# Patient Record
Sex: FEMALE | Race: WHITE | NOT HISPANIC OR LATINO | Employment: FULL TIME | URBAN - METROPOLITAN AREA
[De-identification: names, ages, dates, MRNs, and addresses within clinical notes are randomized per-mention and may not be internally consistent; named-entity substitution may affect disease eponyms.]

---

## 2021-06-08 RX ORDER — LEVOTHYROXINE SODIUM 112 UG/1
224 TABLET ORAL DAILY
Qty: 180 TABLET | Refills: 0 | Status: SHIPPED | OUTPATIENT
Start: 2021-06-08 | End: 2021-10-07

## 2021-10-07 RX ORDER — LEVOTHYROXINE SODIUM 112 UG/1
224 TABLET ORAL DAILY
Qty: 180 TABLET | Refills: 0 | Status: SHIPPED | OUTPATIENT
Start: 2021-10-07 | End: 2021-11-15 | Stop reason: SDUPTHER

## 2021-11-12 ENCOUNTER — LAB (OUTPATIENT)
Dept: LAB | Facility: HOSPITAL | Age: 46
End: 2021-11-12

## 2021-11-12 ENCOUNTER — OFFICE VISIT (OUTPATIENT)
Dept: ENDOCRINOLOGY | Facility: CLINIC | Age: 46
End: 2021-11-12

## 2021-11-12 VITALS
BODY MASS INDEX: 40.29 KG/M2 | SYSTOLIC BLOOD PRESSURE: 124 MMHG | HEIGHT: 69 IN | DIASTOLIC BLOOD PRESSURE: 72 MMHG | HEART RATE: 80 BPM | OXYGEN SATURATION: 99 % | WEIGHT: 272 LBS

## 2021-11-12 DIAGNOSIS — E89.0 POSTOPERATIVE HYPOTHYROIDISM: Primary | ICD-10-CM

## 2021-11-12 DIAGNOSIS — Z85.850 PERSONAL HISTORY OF MALIGNANT NEOPLASM OF THYROID: ICD-10-CM

## 2021-11-12 DIAGNOSIS — E89.0 POSTOPERATIVE HYPOTHYROIDISM: ICD-10-CM

## 2021-11-12 PROCEDURE — 99214 OFFICE O/P EST MOD 30 MIN: CPT | Performed by: INTERNAL MEDICINE

## 2021-11-12 PROCEDURE — 84432 ASSAY OF THYROGLOBULIN: CPT

## 2021-11-12 PROCEDURE — 84443 ASSAY THYROID STIM HORMONE: CPT

## 2021-11-12 RX ORDER — CETIRIZINE HYDROCHLORIDE 10 MG/1
1 TABLET ORAL DAILY
COMMUNITY

## 2021-11-12 RX ORDER — BUPROPION HYDROCHLORIDE 100 MG/1
1 TABLET, EXTENDED RELEASE ORAL 2 TIMES DAILY
COMMUNITY
Start: 2021-08-29 | End: 2022-11-18

## 2021-11-12 RX ORDER — FLUTICASONE PROPIONATE 93 UG/1
SPRAY, METERED NASAL AS NEEDED
COMMUNITY
Start: 2021-10-14

## 2021-11-12 RX ORDER — CITALOPRAM 40 MG/1
1 TABLET ORAL DAILY
COMMUNITY
Start: 2021-10-22

## 2021-11-12 NOTE — ASSESSMENT & PLAN NOTE
Neck exam shows nothing untoward. Will check tg  But so far she appears to have had a complete clinical and biochemical response

## 2021-11-12 NOTE — PROGRESS NOTES
"     Office Note      Date: 2021  Patient Name: Lou Romero  MRN: [id number]  : 1975    Chief Complaint   Patient presents with   • Thyroid Problem       History of Present Illness:   Lou Romero is a 46 y.o. female who presents for Thyroid Problem   she has hypothyroidism and hx of thyroid cancer  Here for routine appointment  ---  She has infected  Right salivary gland  Removed since last time  ---  Now she feels like there is swelling on the left side     Subjective          Review of Systems:   Review of Systems   HENT:        Left neck swelling       The following portions of the patient's history were reviewed and updated as appropriate: allergies, current medications, past family history, past medical history, past social history, past surgical history and problem list.    Objective     Visit Vitals  /72   Pulse 80   Ht 175.3 cm (69\")   Wt 123 kg (272 lb)   SpO2 99%   BMI 40.17 kg/m²       Labs:    CBC w/DIFF  Lab Results   Component Value Date    WBC 6.9 07/15/2021    RBC 4.21 07/15/2021    HGB 11.1 (L) 07/15/2021    HCT 32.9 (L) 07/15/2021    MCV 78.1 (L) 07/15/2021    MCH 26.3 07/15/2021    MCHC 33.7 07/15/2021    RDW 16.6 (H) 07/15/2021    MPV 8.3 07/15/2021     07/15/2021    NEUTRORELPCT 71.5 (H) 07/15/2021    MONORELPCT 8.2 07/15/2021    EOSRELPCT 1.8 07/15/2021    BASORELPCT 0.3 07/15/2021    NEUTROABS 4.9 07/15/2021    LYMPHSABS 1.3 07/15/2021    MONOSABS 0.6 07/15/2021    EOSABS 0.1 07/15/2021    BASOSABS 0.0 07/15/2021       T4  Free T4   Date Value Ref Range Status   2020 1.26 (H) 0.61 - 1.12 Final     Comment:     Updated 19, Free T4 assays can have false high results when the  concentration of biotin in the patient sample is 10 ng/mL or greater.       TSH  No results found for: TSHBASE     Physical Exam:  Physical Exam  Vitals reviewed.   HENT:      Head: Normocephalic and atraumatic.   Eyes:      Extraocular Movements: Extraocular movements intact. "   Neck:      Comments: No palpable thyroid tissue or neck mass  Lymphadenopathy:      Cervical: No cervical adenopathy.   Psychiatric:         Mood and Affect: Mood normal.         Thought Content: Thought content normal.         Judgment: Judgment normal.         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         Current Assessment & Plan     Clinically euthyroid. Thyroid levels ordered. Medication to be adjusted accordingly.         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  tg 0 in 2018 with tsh of 36  Neck exam, tg negative in 2020         Current Assessment & Plan     Neck exam shows nothing untoward. Will check tg  But so far she appears to have had a complete clinical and biochemical response          Relevant Orders    Thyroglobulin           Zachary Little MD   11/12/2021

## 2021-11-12 NOTE — ASSESSMENT & PLAN NOTE
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  tg 0 in 2018 with tsh of 36  Neck exam, tg negative in 2020

## 2021-11-13 LAB — TSH SERPL DL<=0.05 MIU/L-ACNC: 0.19 UIU/ML (ref 0.27–4.2)

## 2021-11-15 RX ORDER — LEVOTHYROXINE SODIUM 112 UG/1
224 TABLET ORAL DAILY
Qty: 180 TABLET | Refills: 3 | Status: SHIPPED | OUTPATIENT
Start: 2021-11-15 | End: 2022-11-20 | Stop reason: DRUGHIGH

## 2021-11-23 LAB — THYROGLOB SERPL-MCNC: <2 NG/ML

## 2022-11-18 ENCOUNTER — LAB (OUTPATIENT)
Dept: LAB | Facility: HOSPITAL | Age: 47
End: 2022-11-18

## 2022-11-18 ENCOUNTER — OFFICE VISIT (OUTPATIENT)
Dept: ENDOCRINOLOGY | Facility: CLINIC | Age: 47
End: 2022-11-18

## 2022-11-18 VITALS
WEIGHT: 265 LBS | HEART RATE: 68 BPM | OXYGEN SATURATION: 98 % | SYSTOLIC BLOOD PRESSURE: 132 MMHG | HEIGHT: 69 IN | DIASTOLIC BLOOD PRESSURE: 76 MMHG | BODY MASS INDEX: 39.25 KG/M2

## 2022-11-18 DIAGNOSIS — E89.0 POSTOPERATIVE HYPOTHYROIDISM: Primary | ICD-10-CM

## 2022-11-18 DIAGNOSIS — Z85.850 PERSONAL HISTORY OF MALIGNANT NEOPLASM OF THYROID: ICD-10-CM

## 2022-11-18 DIAGNOSIS — E89.0 POSTOPERATIVE HYPOTHYROIDISM: ICD-10-CM

## 2022-11-18 PROCEDURE — 86800 THYROGLOBULIN ANTIBODY: CPT

## 2022-11-18 PROCEDURE — 84432 ASSAY OF THYROGLOBULIN: CPT

## 2022-11-18 PROCEDURE — 84443 ASSAY THYROID STIM HORMONE: CPT

## 2022-11-18 PROCEDURE — 99213 OFFICE O/P EST LOW 20 MIN: CPT | Performed by: INTERNAL MEDICINE

## 2022-11-18 RX ORDER — BUSPIRONE HYDROCHLORIDE 7.5 MG/1
TABLET ORAL
COMMUNITY

## 2022-11-18 RX ORDER — FLUOXETINE HYDROCHLORIDE 40 MG/1
CAPSULE ORAL
COMMUNITY

## 2022-11-18 RX ORDER — BUPROPION HYDROCHLORIDE 150 MG/1
TABLET, EXTENDED RELEASE ORAL
COMMUNITY

## 2022-11-18 NOTE — PROGRESS NOTES
"     Office Note      Date: 2022  Patient Name: Lou Romero  MRN: [id number]  : 1975    Chief Complaint   Patient presents with   • Hypothyroidism       History of Present Illness:   Lou Romero is a 47 y.o. female who presents for Hypothyroidism  and hx of thyroid cancer  Rx: T4 224 mcg per day  Changes: has been diagnosed with low iron and low b12  Questions/problem: none    Subjective     Review of Systems:   Review of Systems   Constitutional: Positive for fatigue. Negative for unexpected weight change.   HENT: Positive for sore throat. Negative for trouble swallowing and voice change.    Eyes: Negative for visual disturbance.   Respiratory: Negative for choking.    Cardiovascular: Positive for palpitations.   Endocrine: Positive for heat intolerance.   Musculoskeletal: Positive for myalgias.   Neurological: Positive for headaches. Negative for tremors.   Psychiatric/Behavioral: Positive for sleep disturbance. Negative for dysphoric mood. The patient is nervous/anxious.        The following portions of the patient's history were reviewed and updated as appropriate: allergies, current medications, past family history, past medical history, past social history, past surgical history and problem list.    Objective     Visit Vitals  /76   Pulse 68   Ht 175.3 cm (69\")   Wt 120 kg (265 lb)   SpO2 98%   BMI 39.13 kg/m²       Labs:    CBC w/DIFF  Lab Results   Component Value Date    WBC 3.9 (L) 2022    RBC 4.73 2022    HGB 14.4 2022    HCT 42.9 2022    MCV 90.6 2022    MCH 30.5 2022    MCHC 33.6 2022    RDW 15.4 2022    MPV 8.6 2022     2022    NEUTRORELPCT 58.8 2022    MONORELPCT 13.9 (H) 2022    EOSRELPCT 1.9 2022    BASORELPCT 0.4 2022    NEUTROABS 2.3 2022    LYMPHSABS 1.0 (L) 2022    MONOSABS 0.6 2022    EOSABS 0.1 2022    BASOSABS 0.0 2022       T4  Free T4   Date Value " Ref Range Status   01/23/2020 1.26 (H) 0.61 - 1.12 Final     Comment:     Updated 8/23/19, Free T4 assays can have false high results when the  concentration of biotin in the patient sample is 10 ng/mL or greater.       TSH  No results found for: TSHBASE     Physical Exam:  Physical Exam  Vitals reviewed.   Constitutional:       Appearance: Normal appearance.   HENT:      Head: Normocephalic and atraumatic.   Eyes:      Extraocular Movements: Extraocular movements intact.   Neck:      Comments: No palpable thyroid tissue  Pulmonary:      Effort: Pulmonary effort is normal.   Lymphadenopathy:      Cervical: No cervical adenopathy.   Neurological:      Mental Status: She is alert.      Comments: tremor   Psychiatric:         Mood and Affect: Mood normal.         Thought Content: Thought content normal.         Judgment: Judgment normal.         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  tsh at goal 2021         Current Assessment & Plan     Clinically might be on too much thyroid medication. 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  tg 0 in 2018 with tsh of 36  Neck exam, tg negative in 2020  Neck exam and tg negative in 2021         Current Assessment & Plan     Stable  No clinical evidence of recurrence. Will check tg to confirm          Relevant Orders    Thyroglobulin + Thyroglobulin Antibody ([place])        Zachary Little MD   11/18/2022

## 2022-11-19 LAB — TSH SERPL DL<=0.05 MIU/L-ACNC: 0.09 UIU/ML (ref 0.27–4.2)

## 2022-11-20 LAB — REF LAB TEST METHOD: NORMAL

## 2022-11-20 RX ORDER — LEVOTHYROXINE SODIUM 0.2 MG/1
200 TABLET ORAL DAILY
Qty: 90 TABLET | Refills: 3 | Status: SHIPPED | OUTPATIENT
Start: 2022-11-20 | End: 2023-11-20

## 2022-11-21 DIAGNOSIS — E89.0 POSTOPERATIVE HYPOTHYROIDISM: Primary | ICD-10-CM

## 2023-01-03 RX ORDER — LEVOTHYROXINE SODIUM 112 UG/1
224 TABLET ORAL DAILY
Qty: 180 TABLET | Refills: 3 | Status: SHIPPED | OUTPATIENT
Start: 2023-01-03

## 2023-11-17 ENCOUNTER — OFFICE VISIT (OUTPATIENT)
Dept: ENDOCRINOLOGY | Facility: CLINIC | Age: 48
End: 2023-11-17
Payer: COMMERCIAL

## 2023-11-17 VITALS
DIASTOLIC BLOOD PRESSURE: 78 MMHG | SYSTOLIC BLOOD PRESSURE: 122 MMHG | WEIGHT: 270 LBS | HEART RATE: 64 BPM | OXYGEN SATURATION: 97 % | HEIGHT: 69 IN | BODY MASS INDEX: 39.99 KG/M2

## 2023-11-17 DIAGNOSIS — E89.0 POSTOPERATIVE HYPOTHYROIDISM: Primary | ICD-10-CM

## 2023-11-17 DIAGNOSIS — Z85.850 PERSONAL HISTORY OF MALIGNANT NEOPLASM OF THYROID: ICD-10-CM

## 2023-11-17 LAB
T4 FREE SERPL-MCNC: 1.2 NG/DL (ref 0.93–1.7)
TSH SERPL DL<=0.05 MIU/L-ACNC: 8.98 UIU/ML (ref 0.27–4.2)

## 2023-11-17 PROCEDURE — 84439 ASSAY OF FREE THYROXINE: CPT | Performed by: INTERNAL MEDICINE

## 2023-11-17 PROCEDURE — 84432 ASSAY OF THYROGLOBULIN: CPT | Performed by: INTERNAL MEDICINE

## 2023-11-17 PROCEDURE — 86800 THYROGLOBULIN ANTIBODY: CPT | Performed by: INTERNAL MEDICINE

## 2023-11-17 PROCEDURE — 84443 ASSAY THYROID STIM HORMONE: CPT | Performed by: INTERNAL MEDICINE

## 2023-11-17 NOTE — PROGRESS NOTES
"     Office Note      Date: 2023  Patient Name: Lou Romero  MRN: [id number]  : 1975    Chief Complaint   Patient presents with    Thyroid Problem     Postoperative hypothyroidism         History of Present Illness:   Lou Romero is a 48 y.o. female who presents for Thyroid Problem (Postoperative hypothyroidism/)  .   Current rx: 200 mcg per day     Changes in history:none   Questions /problems:none     Subjective          Review of Systems:   Review of Systems   HENT:  Negative for trouble swallowing.    Cardiovascular:  Negative for palpitations.   Endocrine: Positive for heat intolerance.   Neurological:  Negative for tremors.   Psychiatric/Behavioral:  Positive for sleep disturbance. Negative for dysphoric mood. The patient is not nervous/anxious.        The following portions of the patient's history were reviewed and updated as appropriate: allergies, current medications, past family history, past medical history, past social history, past surgical history, and problem list.    Objective     Visit Vitals  /78 (BP Location: Right arm, Patient Position: Sitting, Cuff Size: Adult)   Pulse 64   Ht 175.3 cm (69\")   Wt 122 kg (270 lb)   SpO2 97%   BMI 39.87 kg/m²           Physical Exam:  Physical Exam  Vitals reviewed.   Constitutional:       Appearance: Normal appearance.   HENT:      Head: Normocephalic and atraumatic.   Eyes:      Extraocular Movements: Extraocular movements intact.   Neck:      Comments: No palpable thyroid tissue   Lymphadenopathy:      Cervical: No cervical adenopathy.   Neurological:      Mental Status: She is alert.         Assessment / Plan      Assessment & Plan:  Problem List Items Addressed This Visit          Other    Postoperative hypothyroidism - Primary    Overview     Surgery in  for hurthle cell cancer .  tsh at goal          Current Assessment & Plan     Clinically euthyroid. Thyroid levels ordered. Medication to be adjusted accordingly.          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  tg 0 in 2018 with tsh of 36  Neck exam, tg negative in 2020  Neck exam and tg negative in 2021  Neck exam and tg negative in 2022         Current Assessment & Plan     No clinical evidence of recurrence on exam  Will check tg          Relevant Orders    Thyroglobulin Antibody and Thyroglobulin, [place] or LC/MS-MS        Electronically signed by : Zachary Little MD   11/17/2023

## 2023-11-20 LAB
THYROGLOB AB SERPL-ACNC: <1 IU/ML (ref 0–0.9)
THYROGLOB SERPL-MCNC: <0.1 NG/ML (ref 1.5–38.5)

## 2023-11-22 RX ORDER — LEVOTHYROXINE SODIUM 0.12 MG/1
250 TABLET ORAL DAILY
Qty: 180 TABLET | Refills: 3 | Status: SHIPPED | OUTPATIENT
Start: 2023-11-22

## 2024-11-18 ENCOUNTER — OFFICE VISIT (OUTPATIENT)
Age: 49
End: 2024-11-18
Payer: COMMERCIAL

## 2024-11-18 VITALS
BODY MASS INDEX: 34.8 KG/M2 | HEART RATE: 66 BPM | SYSTOLIC BLOOD PRESSURE: 122 MMHG | WEIGHT: 235 LBS | HEIGHT: 69 IN | OXYGEN SATURATION: 100 % | DIASTOLIC BLOOD PRESSURE: 72 MMHG

## 2024-11-18 DIAGNOSIS — Z85.850 PERSONAL HISTORY OF MALIGNANT NEOPLASM OF THYROID: ICD-10-CM

## 2024-11-18 DIAGNOSIS — E89.0 POSTOPERATIVE HYPOTHYROIDISM: Primary | ICD-10-CM

## 2024-11-18 LAB
T4 FREE SERPL-MCNC: 2.46 NG/DL (ref 0.92–1.68)
TSH SERPL DL<=0.05 MIU/L-ACNC: 0.05 UIU/ML (ref 0.27–4.2)

## 2024-11-18 PROCEDURE — 84432 ASSAY OF THYROGLOBULIN: CPT | Performed by: INTERNAL MEDICINE

## 2024-11-18 PROCEDURE — 86800 THYROGLOBULIN ANTIBODY: CPT | Performed by: INTERNAL MEDICINE

## 2024-11-18 PROCEDURE — 84439 ASSAY OF FREE THYROXINE: CPT | Performed by: INTERNAL MEDICINE

## 2024-11-18 PROCEDURE — 99213 OFFICE O/P EST LOW 20 MIN: CPT | Performed by: INTERNAL MEDICINE

## 2024-11-18 PROCEDURE — 84443 ASSAY THYROID STIM HORMONE: CPT | Performed by: INTERNAL MEDICINE

## 2024-11-18 NOTE — PROGRESS NOTES
"     Office Note      Date: 2024  Patient Name: Lou Romero  MRN: [id number]  : 1975     Cc: follow up thyroid     History of Present Illness:   Lou Romero is a 49 y.o. female who presents for  follow up thyroid .   Current rx: t4- 250 mcg per day     Changes in history:none   Questions /problems:none     Subjective          Review of Systems:   Review of Systems   Constitutional:  Negative for fatigue.   HENT:  Negative for trouble swallowing and voice change.    Cardiovascular:  Positive for palpitations.   Endocrine: Negative for cold intolerance and heat intolerance.   Neurological:  Positive for tremors.   Psychiatric/Behavioral:  Negative for dysphoric mood and sleep disturbance. The patient is not nervous/anxious.        The following portions of the patient's history were reviewed and updated as appropriate: allergies, current medications, past family history, past medical history, past social history, past surgical history, and problem list.    Objective     Visit Vitals  /72 (BP Location: Left arm, Patient Position: Sitting, Cuff Size: Adult)   Pulse 66   Ht 175.3 cm (69\")   Wt 107 kg (235 lb)   SpO2 100%   BMI 34.70 kg/m²           Physical Exam:  Physical Exam  Vitals reviewed.   Constitutional:       Appearance: Normal appearance.   Neck:      Comments: No palpable thyroid tissue  Lymphadenopathy:      Cervical: No cervical adenopathy.   Neurological:      Mental Status: She is alert.      Comments: + tremor   Psychiatric:         Mood and Affect: Mood normal.         Behavior: Behavior normal.         Thought Content: Thought content normal.         Judgment: Judgment normal.         Assessment / Plan      Assessment & Plan:  Problem List Items Addressed This Visit       Postoperative hypothyroidism - Primary    Overview     Surgery in  for hurthle cell cancer .  tsh at goal          Current Assessment & Plan     Clinically euthyroid. Thyroid levels ordered. Medication " to be adjusted accordingly.          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  tg 0 in 2018 with tsh of 36  Neck exam, tg negative in 2020  Neck exam and tg negative in 2021  Neck exam and tg negative in 2022/ 2023         Current Assessment & Plan     There is no clinical evidence of recurrent of thyroid cancer; will check thyroglobulin level to confirm. Results to follow           Relevant Orders    Thyroglobulin Antibody and Thyroglobulin, [facility] or LC/MS-MS        Electronically signed by : Zachary Little MD   11/18/2024

## 2024-11-20 LAB
THYROGLOB AB SERPL-ACNC: <1 IU/ML (ref 0–0.9)
THYROGLOB SERPL-MCNC: <0.1 NG/ML (ref 1.5–38.5)

## 2025-02-19 RX ORDER — LEVOTHYROXINE SODIUM 125 UG/1
250 TABLET ORAL DAILY
Qty: 180 TABLET | Refills: 2 | Status: SHIPPED | OUTPATIENT
Start: 2025-02-19

## 2025-02-19 NOTE — TELEPHONE ENCOUNTER
Rx Refill Note  Requested Prescriptions     Pending Prescriptions Disp Refills    levothyroxine (SYNTHROID, LEVOTHROID) 125 MCG tablet [Pharmacy Med Name: LEVOTHYROXINE 125 MCG TABLET] 180 tablet 2     Sig: TAKE 2 TABLETS BY MOUTH EVERY DAY      Last office visit with prescribing clinician: 11/18/2024     Next office visit with prescribing clinician: 11/18/2025       Sheila Owens  02/19/25, 10:03 EST